# Patient Record
Sex: FEMALE | Race: WHITE | NOT HISPANIC OR LATINO | Employment: UNEMPLOYED | URBAN - METROPOLITAN AREA
[De-identification: names, ages, dates, MRNs, and addresses within clinical notes are randomized per-mention and may not be internally consistent; named-entity substitution may affect disease eponyms.]

---

## 2018-10-22 LAB — STREP GP B DNA PCR: NEGATIVE

## 2018-11-28 ENCOUNTER — APPOINTMENT (OUTPATIENT)
Dept: OBGYN | Facility: MEDICAL CENTER | Age: 38
End: 2018-11-28
Attending: OBSTETRICS & GYNECOLOGY
Payer: COMMERCIAL

## 2018-11-28 ENCOUNTER — HOSPITAL ENCOUNTER (INPATIENT)
Facility: MEDICAL CENTER | Age: 38
LOS: 3 days | End: 2018-12-01
Attending: OBSTETRICS & GYNECOLOGY | Admitting: OBSTETRICS & GYNECOLOGY
Payer: COMMERCIAL

## 2018-11-28 PROCEDURE — 770002 HCHG ROOM/CARE - OB PRIVATE (112)

## 2018-11-28 PROCEDURE — 10H07YZ INSERTION OF OTHER DEVICE INTO PRODUCTS OF CONCEPTION, VIA NATURAL OR ARTIFICIAL OPENING: ICD-10-PCS | Performed by: OBSTETRICS & GYNECOLOGY

## 2018-11-28 PROCEDURE — 3E0P7VZ INTRODUCTION OF HORMONE INTO FEMALE REPRODUCTIVE, VIA NATURAL OR ARTIFICIAL OPENING: ICD-10-PCS | Performed by: OBSTETRICS & GYNECOLOGY

## 2018-11-28 RX ORDER — MISOPROSTOL 200 UG/1
800 TABLET ORAL
Status: DISCONTINUED | OUTPATIENT
Start: 2018-11-28 | End: 2018-11-29 | Stop reason: HOSPADM

## 2018-11-28 RX ORDER — SODIUM CHLORIDE, SODIUM LACTATE, POTASSIUM CHLORIDE, CALCIUM CHLORIDE 600; 310; 30; 20 MG/100ML; MG/100ML; MG/100ML; MG/100ML
INJECTION, SOLUTION INTRAVENOUS CONTINUOUS
Status: DISPENSED | OUTPATIENT
Start: 2018-11-28 | End: 2018-11-29

## 2018-11-28 RX ORDER — DEXTROSE, SODIUM CHLORIDE, SODIUM LACTATE, POTASSIUM CHLORIDE, AND CALCIUM CHLORIDE 5; .6; .31; .03; .02 G/100ML; G/100ML; G/100ML; G/100ML; G/100ML
INJECTION, SOLUTION INTRAVENOUS CONTINUOUS
Status: DISCONTINUED | OUTPATIENT
Start: 2018-11-29 | End: 2018-12-01 | Stop reason: HOSPADM

## 2018-11-28 RX ORDER — CITRIC ACID/SODIUM CITRATE 334-500MG
30 SOLUTION, ORAL ORAL EVERY 6 HOURS PRN
Status: DISCONTINUED | OUTPATIENT
Start: 2018-11-28 | End: 2018-11-29 | Stop reason: HOSPADM

## 2018-11-29 LAB
BASOPHILS # BLD AUTO: 0.2 % (ref 0–1.8)
BASOPHILS # BLD: 0.02 K/UL (ref 0–0.12)
EOSINOPHIL # BLD AUTO: 0.05 K/UL (ref 0–0.51)
EOSINOPHIL NFR BLD: 0.5 % (ref 0–6.9)
ERYTHROCYTE [DISTWIDTH] IN BLOOD BY AUTOMATED COUNT: 43.4 FL (ref 35.9–50)
ERYTHROCYTE [DISTWIDTH] IN BLOOD BY AUTOMATED COUNT: 43.7 FL (ref 35.9–50)
HCT VFR BLD AUTO: 34.3 % (ref 37–47)
HCT VFR BLD AUTO: 36.8 % (ref 37–47)
HGB BLD-MCNC: 11.6 G/DL (ref 12–16)
HGB BLD-MCNC: 13.1 G/DL (ref 12–16)
HOLDING TUBE BB 8507: NORMAL
IMM GRANULOCYTES # BLD AUTO: 0.05 K/UL (ref 0–0.11)
IMM GRANULOCYTES NFR BLD AUTO: 0.5 % (ref 0–0.9)
LYMPHOCYTES # BLD AUTO: 2.7 K/UL (ref 1–4.8)
LYMPHOCYTES NFR BLD: 28.3 % (ref 22–41)
MCH RBC QN AUTO: 30.4 PG (ref 27–33)
MCH RBC QN AUTO: 31.6 PG (ref 27–33)
MCHC RBC AUTO-ENTMCNC: 33.8 G/DL (ref 33.6–35)
MCHC RBC AUTO-ENTMCNC: 35.6 G/DL (ref 33.6–35)
MCV RBC AUTO: 88.7 FL (ref 81.4–97.8)
MCV RBC AUTO: 90 FL (ref 81.4–97.8)
MONOCYTES # BLD AUTO: 0.71 K/UL (ref 0–0.85)
MONOCYTES NFR BLD AUTO: 7.4 % (ref 0–13.4)
NEUTROPHILS # BLD AUTO: 6.02 K/UL (ref 2–7.15)
NEUTROPHILS NFR BLD: 63.1 % (ref 44–72)
NRBC # BLD AUTO: 0 K/UL
NRBC BLD-RTO: 0 /100 WBC
PLATELET # BLD AUTO: 131 K/UL (ref 164–446)
PLATELET # BLD AUTO: 180 K/UL (ref 164–446)
PMV BLD AUTO: 10.3 FL (ref 9–12.9)
PMV BLD AUTO: 10.5 FL (ref 9–12.9)
RBC # BLD AUTO: 3.81 M/UL (ref 4.2–5.4)
RBC # BLD AUTO: 4.15 M/UL (ref 4.2–5.4)
WBC # BLD AUTO: 12.6 K/UL (ref 4.8–10.8)
WBC # BLD AUTO: 9.6 K/UL (ref 4.8–10.8)

## 2018-11-29 PROCEDURE — 700105 HCHG RX REV CODE 258: Performed by: OBSTETRICS & GYNECOLOGY

## 2018-11-29 PROCEDURE — 303615 HCHG EPIDURAL/SPINAL ANESTHESIA FOR LABOR

## 2018-11-29 PROCEDURE — 304965 HCHG RECOVERY SERVICES

## 2018-11-29 PROCEDURE — 0HQ9XZZ REPAIR PERINEUM SKIN, EXTERNAL APPROACH: ICD-10-PCS | Performed by: OBSTETRICS & GYNECOLOGY

## 2018-11-29 PROCEDURE — 59409 OBSTETRICAL CARE: CPT

## 2018-11-29 PROCEDURE — 700101 HCHG RX REV CODE 250

## 2018-11-29 PROCEDURE — 36415 COLL VENOUS BLD VENIPUNCTURE: CPT

## 2018-11-29 PROCEDURE — 85025 COMPLETE CBC W/AUTO DIFF WBC: CPT

## 2018-11-29 PROCEDURE — 700111 HCHG RX REV CODE 636 W/ 250 OVERRIDE (IP): Performed by: OBSTETRICS & GYNECOLOGY

## 2018-11-29 PROCEDURE — 85027 COMPLETE CBC AUTOMATED: CPT

## 2018-11-29 PROCEDURE — A9270 NON-COVERED ITEM OR SERVICE: HCPCS | Performed by: OBSTETRICS & GYNECOLOGY

## 2018-11-29 PROCEDURE — 700111 HCHG RX REV CODE 636 W/ 250 OVERRIDE (IP)

## 2018-11-29 PROCEDURE — 10907ZC DRAINAGE OF AMNIOTIC FLUID, THERAPEUTIC FROM PRODUCTS OF CONCEPTION, VIA NATURAL OR ARTIFICIAL OPENING: ICD-10-PCS | Performed by: OBSTETRICS & GYNECOLOGY

## 2018-11-29 PROCEDURE — 59200 INSERT CERVICAL DILATOR: CPT

## 2018-11-29 PROCEDURE — 700102 HCHG RX REV CODE 250 W/ 637 OVERRIDE(OP): Performed by: OBSTETRICS & GYNECOLOGY

## 2018-11-29 PROCEDURE — 3E033VJ INTRODUCTION OF OTHER HORMONE INTO PERIPHERAL VEIN, PERCUTANEOUS APPROACH: ICD-10-PCS | Performed by: OBSTETRICS & GYNECOLOGY

## 2018-11-29 PROCEDURE — 770002 HCHG ROOM/CARE - OB PRIVATE (112)

## 2018-11-29 RX ORDER — SODIUM CHLORIDE, SODIUM LACTATE, POTASSIUM CHLORIDE, CALCIUM CHLORIDE 600; 310; 30; 20 MG/100ML; MG/100ML; MG/100ML; MG/100ML
INJECTION, SOLUTION INTRAVENOUS CONTINUOUS
Status: DISCONTINUED | OUTPATIENT
Start: 2018-11-29 | End: 2018-11-29

## 2018-11-29 RX ORDER — SODIUM CHLORIDE, SODIUM LACTATE, POTASSIUM CHLORIDE, AND CALCIUM CHLORIDE .6; .31; .03; .02 G/100ML; G/100ML; G/100ML; G/100ML
1000 INJECTION, SOLUTION INTRAVENOUS
Status: DISCONTINUED | OUTPATIENT
Start: 2018-11-29 | End: 2018-11-29 | Stop reason: HOSPADM

## 2018-11-29 RX ORDER — ONDANSETRON 2 MG/ML
4 INJECTION INTRAMUSCULAR; INTRAVENOUS EVERY 6 HOURS PRN
Status: DISCONTINUED | OUTPATIENT
Start: 2018-11-29 | End: 2018-12-01 | Stop reason: HOSPADM

## 2018-11-29 RX ORDER — BISACODYL 10 MG
10 SUPPOSITORY, RECTAL RECTAL PRN
Status: DISCONTINUED | OUTPATIENT
Start: 2018-11-29 | End: 2018-12-01 | Stop reason: HOSPADM

## 2018-11-29 RX ORDER — ONDANSETRON 2 MG/ML
INJECTION INTRAMUSCULAR; INTRAVENOUS
Status: COMPLETED
Start: 2018-11-29 | End: 2018-11-29

## 2018-11-29 RX ORDER — ONDANSETRON 4 MG/1
4 TABLET, ORALLY DISINTEGRATING ORAL EVERY 6 HOURS PRN
Status: DISCONTINUED | OUTPATIENT
Start: 2018-11-29 | End: 2018-12-01 | Stop reason: HOSPADM

## 2018-11-29 RX ORDER — DOCUSATE SODIUM 100 MG/1
100 CAPSULE, LIQUID FILLED ORAL 2 TIMES DAILY PRN
Status: DISCONTINUED | OUTPATIENT
Start: 2018-11-29 | End: 2018-12-01 | Stop reason: HOSPADM

## 2018-11-29 RX ORDER — MISOPROSTOL 200 UG/1
800 TABLET ORAL
Status: DISCONTINUED | OUTPATIENT
Start: 2018-11-29 | End: 2018-12-01 | Stop reason: HOSPADM

## 2018-11-29 RX ORDER — ROPIVACAINE HYDROCHLORIDE 2 MG/ML
INJECTION, SOLUTION EPIDURAL; INFILTRATION; PERINEURAL CONTINUOUS
Status: DISCONTINUED | OUTPATIENT
Start: 2018-11-29 | End: 2018-11-29

## 2018-11-29 RX ORDER — ROPIVACAINE HYDROCHLORIDE 2 MG/ML
INJECTION, SOLUTION EPIDURAL; INFILTRATION; PERINEURAL
Status: COMPLETED
Start: 2018-11-29 | End: 2018-11-29

## 2018-11-29 RX ORDER — SODIUM CHLORIDE, SODIUM LACTATE, POTASSIUM CHLORIDE, AND CALCIUM CHLORIDE .6; .31; .03; .02 G/100ML; G/100ML; G/100ML; G/100ML
250 INJECTION, SOLUTION INTRAVENOUS PRN
Status: DISCONTINUED | OUTPATIENT
Start: 2018-11-29 | End: 2018-11-29 | Stop reason: HOSPADM

## 2018-11-29 RX ORDER — ACETAMINOPHEN 325 MG/1
325 TABLET ORAL EVERY 4 HOURS PRN
Status: DISCONTINUED | OUTPATIENT
Start: 2018-11-29 | End: 2018-12-01 | Stop reason: HOSPADM

## 2018-11-29 RX ORDER — BUPIVACAINE HYDROCHLORIDE 2.5 MG/ML
INJECTION, SOLUTION EPIDURAL; INFILTRATION; INTRACAUDAL
Status: DISCONTINUED
Start: 2018-11-29 | End: 2018-11-29

## 2018-11-29 RX ORDER — HYDROCODONE BITARTRATE AND ACETAMINOPHEN 10; 325 MG/1; MG/1
1 TABLET ORAL EVERY 4 HOURS PRN
Status: DISCONTINUED | OUTPATIENT
Start: 2018-11-29 | End: 2018-12-01 | Stop reason: HOSPADM

## 2018-11-29 RX ORDER — SODIUM CHLORIDE, SODIUM LACTATE, POTASSIUM CHLORIDE, CALCIUM CHLORIDE 600; 310; 30; 20 MG/100ML; MG/100ML; MG/100ML; MG/100ML
INJECTION, SOLUTION INTRAVENOUS PRN
Status: DISCONTINUED | OUTPATIENT
Start: 2018-11-29 | End: 2018-12-01 | Stop reason: HOSPADM

## 2018-11-29 RX ORDER — IBUPROFEN 600 MG/1
600 TABLET ORAL EVERY 6 HOURS PRN
Status: DISCONTINUED | OUTPATIENT
Start: 2018-11-29 | End: 2018-12-01 | Stop reason: HOSPADM

## 2018-11-29 RX ORDER — VITAMIN A ACETATE, BETA CAROTENE, ASCORBIC ACID, CHOLECALCIFEROL, .ALPHA.-TOCOPHEROL ACETATE, DL-, THIAMINE MONONITRATE, RIBOFLAVIN, NIACINAMIDE, PYRIDOXINE HYDROCHLORIDE, FOLIC ACID, CYANOCOBALAMIN, CALCIUM CARBONATE, FERROUS FUMARATE, ZINC OXIDE, CUPRIC OXIDE 3080; 12; 120; 400; 1; 1.84; 3; 20; 22; 920; 25; 200; 27; 10; 2 [IU]/1; UG/1; MG/1; [IU]/1; MG/1; MG/1; MG/1; MG/1; MG/1; [IU]/1; MG/1; MG/1; MG/1; MG/1; MG/1
1 TABLET, FILM COATED ORAL EVERY MORNING
Status: DISCONTINUED | OUTPATIENT
Start: 2018-11-30 | End: 2018-12-01 | Stop reason: HOSPADM

## 2018-11-29 RX ORDER — HYDROCODONE BITARTRATE AND ACETAMINOPHEN 5; 325 MG/1; MG/1
1 TABLET ORAL EVERY 4 HOURS PRN
Status: DISCONTINUED | OUTPATIENT
Start: 2018-11-29 | End: 2018-12-01 | Stop reason: HOSPADM

## 2018-11-29 RX ADMIN — MISOPROSTOL 25 MCG: 100 TABLET ORAL at 01:15

## 2018-11-29 RX ADMIN — ONDANSETRON 4 MG: 2 INJECTION INTRAMUSCULAR; INTRAVENOUS at 09:42

## 2018-11-29 RX ADMIN — SODIUM CHLORIDE, POTASSIUM CHLORIDE, SODIUM LACTATE AND CALCIUM CHLORIDE: 600; 310; 30; 20 INJECTION, SOLUTION INTRAVENOUS at 00:00

## 2018-11-29 RX ADMIN — ROPIVACAINE HYDROCHLORIDE: 2 INJECTION, SOLUTION EPIDURAL; INFILTRATION at 10:25

## 2018-11-29 RX ADMIN — IBUPROFEN 600 MG: 600 TABLET, FILM COATED ORAL at 18:29

## 2018-11-29 RX ADMIN — Medication 1 MILLI-UNITS/MIN: at 06:40

## 2018-11-29 RX ADMIN — ROPIVACAINE HYDROCHLORIDE: 2 INJECTION, SOLUTION EPIDURAL; INFILTRATION; PERINEURAL at 10:25

## 2018-11-29 RX ADMIN — SODIUM CHLORIDE, POTASSIUM CHLORIDE, SODIUM LACTATE AND CALCIUM CHLORIDE: 600; 310; 30; 20 INJECTION, SOLUTION INTRAVENOUS at 06:35

## 2018-11-29 RX ADMIN — IBUPROFEN 600 MG: 600 TABLET, FILM COATED ORAL at 12:37

## 2018-11-29 RX ADMIN — ONDANSETRON 4 MG: 4 TABLET, ORALLY DISINTEGRATING ORAL at 16:26

## 2018-11-29 RX ADMIN — SODIUM CHLORIDE, SODIUM LACTATE, POTASSIUM CHLORIDE, CALCIUM CHLORIDE AND DEXTROSE MONOHYDRATE: 5; 600; 310; 30; 20 INJECTION, SOLUTION INTRAVENOUS at 09:02

## 2018-11-29 RX ADMIN — Medication 125 ML/HR: at 12:37

## 2018-11-29 ASSESSMENT — COPD QUESTIONNAIRES
DURING THE PAST 4 WEEKS HOW MUCH DID YOU FEEL SHORT OF BREATH: NONE/LITTLE OF THE TIME
COPD SCREENING SCORE: 0
HAVE YOU SMOKED AT LEAST 100 CIGARETTES IN YOUR ENTIRE LIFE: NO/DON'T KNOW
IN THE PAST 12 MONTHS DO YOU DO LESS THAN YOU USED TO BECAUSE OF YOUR BREATHING PROBLEMS: DISAGREE/UNSURE
DO YOU EVER COUGH UP ANY MUCUS OR PHLEGM?: NO/ONLY WITH OCCASIONAL COLDS OR INFECTIONS

## 2018-11-29 ASSESSMENT — PAIN SCALES - GENERAL
PAINLEVEL_OUTOF10: 4
PAINLEVEL_OUTOF10: 3
PAINLEVEL_OUTOF10: 0
PAINLEVEL_OUTOF10: 2

## 2018-11-29 ASSESSMENT — LIFESTYLE VARIABLES
EVER_SMOKED: NEVER
ALCOHOL_USE: NO

## 2018-11-29 ASSESSMENT — PATIENT HEALTH QUESTIONNAIRE - PHQ9
SUM OF ALL RESPONSES TO PHQ9 QUESTIONS 1 AND 2: 0
1. LITTLE INTEREST OR PLEASURE IN DOING THINGS: NOT AT ALL
2. FEELING DOWN, DEPRESSED, IRRITABLE, OR HOPELESS: NOT AT ALL

## 2018-11-29 NOTE — H&P
"Department of Obstetrics and Gynecology  Labor and Delivery History and Physical    Date of Admission: 2018      ID: 38 y.o.  with IUP at 40w4d     Primary OB: Armando Hendricks M.D.    Attending OB: Armando Hendricks M.D.    CC: Elective IOL, post dates    HPI: Criss Walker is a 38 y.o.  at 40w4d by LMP c/w 14 week ultrasound, who presents with for elective IOL given GA post dates.  She is feeling well.  The patient received 1 dose of misoprostol for cervical ripening overnight.  She was yuni too much for another dose and was started on oxytocin.   She is otherwise feeling well.  Her contractions are not uncomfortable yet.  She denies leaking of fluid, vaginal bleeding.  She has had good fetal movement.  Current pregnancy has been uncomplicated, except for AMA, however, her history of 2 prior pregnancies complicated by IUGR required increased surveillance.    ROS: 10 systems reviewed and negative except as noted above.    Obstetric History    -, 39 weeks, female, 5 pounds 16 ounces, IUGR with \"calcified placenta\" early in gestation, induction of labor for the same   G2 -, 39 weeks, male, 5 pounds 11 ounces, IUGR with \"calcified placenta\" early in gestation, induction of labor for the same.    G3 - Current, as noted in HPI      Past Medical History  Surgical History   AMA  CF carrier  LASEK eye surgery      Gynecologic History  Social History   Regular menses prior to pregnancy  Denies Hx of abnormal pap smears, since the age of 24.  Denies Hx of STIs Tobacco: Denies  EtOH: Denies  Street Drugs: Denies      Medications  Allergies   PNV No Known Allergies     Family History   Elevated cholesterol (father), hypertension (father), melanoma (sister)       O: /78   Pulse 73   Temp 36.7 °C (98.1 °F) (Temporal)   Resp 18   Ht 1.448 m (4' 9\")   Wt 57.6 kg (127 lb)   BMI 27.48 kg/m²       Gen: NAD, AAO  Resp: unlabored  Abd: Gravid, NTTP,Cephalic by Leopolds, No rebound or " guarding  Ext: NTTP, no edema, 2+DPP  Pelvic: SVE 2-3/60/-3, AROM for clear fluid    FHT:  135/mod mirian/+accels, -decels  Ezel: CTX q3-4min    Labs:   Lab Results   Component Value Date/Time    WBC 9.6 2018 12:00 AM    RBC 4.15 (L) 2018 12:00 AM    HEMOGLOBIN 13.1 2018 12:00 AM    HEMATOCRIT 36.8 (L) 2018 12:00 AM    MCV 88.7 2018 12:00 AM    RDW 43.4 2018 12:00 AM    PLATELETCT 180 2018 12:00 AM       Prenatal labs:   Lab  Result    Rh  positive    Antibody screen  negative    Rubella  immune    HIV  Non-reactive    RPR  Non-reactive    HBsAg  negative    Varicella  immune    Urine Culture  wnl    Gonorrhea/chlamydia/trich  negative/negative/negative    Aneuploidy screening  cffDNA negative, AFP negative    1h Glucose  127 wnl    GBS  negative       A/P: Criss Walker is a  at 40w4d by LMP c/w 14wk U/S who presents for elective induction of labor given gestational age post dates..  AVSS.  Cat I FHT.  *Admit to L&D  *IV, CBC, T&S on hold  *Labor: Patient is received 1 dose of misoprostol and is yuni too much for more.  She was started on oxytocin and just underwent artificial rupture of membranes with placement of IUPC for contraction monitoring.  Recheck patient's cervix when uncomfortable.  *History of IUGR: Baby has been appropriately grown throughout this pregnancy as followed with Dr. Mandel.  *FWB: Reassuring, reactive, Cat I FHT.  CEFM.  *Pain: Planning epidural when appropriate  *Global: Rh+, RubImm, VarImm, GBS neg.    - Clears    Armando Hendricks MD, MS,  2018, 7:56 AM

## 2018-11-29 NOTE — PROGRESS NOTES
0700- Report received from JESIKA Key RN.  0741- Dr. Hendricks at the bedside, SVE 2-3/60/-3, AROM, clr, IUPC placed.  0935- LR bolus started for epidural bolus.  0940- pitocin discontinued.  0950- SVE 4-5/90/-1, ONESIMO Rivera RN. Dr. Laguerre requested for epidural placement.  1000- pt feeling pressure, SVE 5-6/90/-1, ONESIMO Rivera, SILVER.  Dr. Hendricks notified of pt's labor status.  1015- Dr. Laguerre at the bedside, epidural placed.  1030- SVE 7-8/90/0, ONESIMO Rivera, SILVER, Dr. Hendricks updated on pt's status.  1040- straight cath, 125mL urine output.  1053- Dr. Hendricks SVE ant lip reduced, C/+1  1126- Dr. Hendricks, , viable female, apgars 8, 9.  1129- Placenta del  1440- Report given to SOREN Velasquez RN. Pt to be transferred to PP unit.

## 2018-11-29 NOTE — CARE PLAN
Problem: Knowledge Deficit  Goal: Patient/Support person demonstrates understanding regarding the progression of labor, available options and participates in decision-making process  Outcome: PROGRESSING AS EXPECTED  POC reviewed and understanding verbalized.    Problem: Pain  Goal: Alleviation of Pain or a reduction in pain to the patient's comfort goal  Outcome: PROGRESSING AS EXPECTED  Pt denies pain and aware of pain management options.

## 2018-11-29 NOTE — PROGRESS NOTES
RUBY   40.3 weeks GA    2330_ Pt presents to L&D for scheduled IOL. Pt denies painful UC's, LOF and VB. Pt states +FM. EFM and TOCO applied. VSS.  0000_ SVE  /-3  0115_ Cytotec placed by SOREN Newman RN  0530_ SVE  2/50/-3  0640_ Pitocin started  0700_ Report to ONESIMO Rivera RN.

## 2018-11-30 PROCEDURE — 700102 HCHG RX REV CODE 250 W/ 637 OVERRIDE(OP): Performed by: OBSTETRICS & GYNECOLOGY

## 2018-11-30 PROCEDURE — A9270 NON-COVERED ITEM OR SERVICE: HCPCS | Performed by: OBSTETRICS & GYNECOLOGY

## 2018-11-30 PROCEDURE — 770002 HCHG ROOM/CARE - OB PRIVATE (112)

## 2018-11-30 RX ORDER — IBUPROFEN 600 MG/1
600 TABLET ORAL EVERY 6 HOURS PRN
Qty: 30 TAB | Refills: 1 | Status: SHIPPED | OUTPATIENT
Start: 2018-11-30

## 2018-11-30 RX ORDER — VALACYCLOVIR HYDROCHLORIDE 500 MG/1
1000 TABLET, FILM COATED ORAL DAILY
Status: DISCONTINUED | OUTPATIENT
Start: 2018-11-30 | End: 2018-12-01 | Stop reason: HOSPADM

## 2018-11-30 RX ORDER — ACYCLOVIR 400 MG/1
400 TABLET ORAL 2 TIMES DAILY
Qty: 30 TAB | Refills: 1 | Status: SHIPPED | OUTPATIENT
Start: 2018-11-30

## 2018-11-30 RX ORDER — ACYCLOVIR 400 MG/1
400 TABLET ORAL 2 TIMES DAILY
Qty: 30 TAB | Refills: 1 | Status: SHIPPED | OUTPATIENT
Start: 2018-11-30 | End: 2018-11-30

## 2018-11-30 RX ORDER — PSEUDOEPHEDRINE HCL 30 MG
100 TABLET ORAL 2 TIMES DAILY PRN
Qty: 60 CAP | Refills: 1 | Status: SHIPPED | OUTPATIENT
Start: 2018-11-30

## 2018-11-30 RX ORDER — VALACYCLOVIR HYDROCHLORIDE 500 MG/1
500 TABLET, FILM COATED ORAL 2 TIMES DAILY
Status: DISCONTINUED | OUTPATIENT
Start: 2018-11-30 | End: 2018-11-30

## 2018-11-30 RX ADMIN — IBUPROFEN 600 MG: 600 TABLET, FILM COATED ORAL at 05:43

## 2018-11-30 RX ADMIN — IBUPROFEN 600 MG: 600 TABLET, FILM COATED ORAL at 11:27

## 2018-11-30 RX ADMIN — Medication 1 TABLET: at 08:29

## 2018-11-30 RX ADMIN — IBUPROFEN 600 MG: 600 TABLET, FILM COATED ORAL at 23:30

## 2018-11-30 RX ADMIN — VALACYCLOVIR HYDROCHLORIDE 1000 MG: 500 TABLET, FILM COATED ORAL at 09:30

## 2018-11-30 RX ADMIN — IBUPROFEN 600 MG: 600 TABLET, FILM COATED ORAL at 00:02

## 2018-11-30 RX ADMIN — IBUPROFEN 600 MG: 600 TABLET, FILM COATED ORAL at 17:34

## 2018-11-30 ASSESSMENT — EDINBURGH POSTNATAL DEPRESSION SCALE (EPDS)
I HAVE FELT SAD OR MISERABLE: NO, NOT AT ALL
THE THOUGHT OF HARMING MYSELF HAS OCCURRED TO ME: NEVER
I HAVE LOOKED FORWARD WITH ENJOYMENT TO THINGS: AS MUCH AS I EVER DID
I HAVE BEEN ANXIOUS OR WORRIED FOR NO GOOD REASON: NO, NOT AT ALL
I HAVE FELT SCARED OR PANICKY FOR NO GOOD REASON: NO, NOT MUCH
I HAVE BEEN SO UNHAPPY THAT I HAVE HAD DIFFICULTY SLEEPING: NOT AT ALL
I HAVE BEEN SO UNHAPPY THAT I HAVE BEEN CRYING: NO, NEVER
I HAVE BEEN ABLE TO LAUGH AND SEE THE FUNNY SIDE OF THINGS: AS MUCH AS I ALWAYS COULD
THINGS HAVE BEEN GETTING ON TOP OF ME: NO, MOST OF THE TIME I HAVE COPED QUITE WELL
I HAVE BLAMED MYSELF UNNECESSARILY WHEN THINGS WENT WRONG: NOT VERY OFTEN

## 2018-11-30 ASSESSMENT — PAIN SCALES - GENERAL
PAINLEVEL_OUTOF10: 4
PAINLEVEL_OUTOF10: 2
PAINLEVEL_OUTOF10: 2
PAINLEVEL_OUTOF10: 4
PAINLEVEL_OUTOF10: 2
PAINLEVEL_OUTOF10: 2
PAINLEVEL_OUTOF10: 3
PAINLEVEL_OUTOF10: 3
PAINLEVEL_OUTOF10: 2

## 2018-11-30 NOTE — LACTATION NOTE
"This note was copied from a baby's chart.  Met with mother who states baby BF well and frequently throughout the night, baby is currently sleepy, assisted with BF attempt but no feeding cues noted and no latch obtained, large drops of colostrum expressed easily via HE, discussed expected feeding frequency and duration, discussed normal  behaviors including sleep-wake cycle, mother reports that baby sucks well at times but at others \"chomps\" on the breast, verbal education provided along with a physical demonstration of suck training t encourage more consistent effective sucking, encouraged to call for latch assistance as needed.    Discussed outpatient assistance available at [facility] after discharge, invited to BF [place] and encouraged to call to schedule 1:1 consult as needed    Encouraged to call for assistance as needed  "

## 2018-11-30 NOTE — PROGRESS NOTES
Obstetrics and Gynecology  Postpartum Progress Note    CC: PPD1 s/p     S: Pt feeling well.  Pain well controlled.  Elza reg diet.  Has ambulated.  Lochia mild. Voiding w/o difficulty.  -flatus/-BM.  Breastfeeding.  Pt denies CP/SOB, N/V, constipation/diarrhea, lower leg pain.      O:   Vitals:    18 1423 18 1600 18 2000 18 0000   BP: 107/61 108/71 112/59 106/72   Pulse: 73 63 (!) 59 62   Resp:  16 18 16   Temp:  36.6 °C (97.8 °F) 36.6 °C (97.8 °F) 36.5 °C (97.7 °F)   TempSrc:  Temporal Temporal Temporal   SpO2:  96% 97% 94%   Weight:       Height:          Temp (24hrs), Av.6 °C (97.8 °F), Min:36.5 °C (97.7 °F), Max:36.6 °C (97.8 °F)       Gen: NAD, AAO    CV: RRR    Pulm: unlabored    Abd: Soft, NT, no rebound/guarding, fundus firm at U.    Ext: WWP, no edema, non-tender to palpation    CBC   2018 00:00 2018 20:35   WBC 9.6 12.6 (H)   RBC 4.15 (L) 3.81 (L)   Hemoglobin 13.1 11.6 (L)   Hematocrit 36.8 (L) 34.3 (L)   MCV 88.7 90.0   MCH 31.6 30.4   MCHC 35.6 (H) 33.8   RDW 43.4 43.7   Platelet Count 180 131 (L)       A/P: Criss Walker is a 38 y.o.  postpartum s/p .  AVSS.  Recovering well.    - Continue routine postpartum care.    - Encourage ambulation.    - Continue current pain regimen    - Rh +.  Rhogam NOT indicated.    - Rubella Imm.  Varicella Imm.    Anticipate d/c tomorrow    Armando Hendricks MD, MS,  2018, 8:54 AM

## 2018-11-30 NOTE — L&D DELIVERY NOTE
DATE OF SERVICE:  2018    PRIMARY AND DELIVERING OBSTETRICIAN:  Armando Hendricks MD    PROCEDURES:  Normal spontaneous vaginal delivery.    PREPROCEDURE DIAGNOSES:  1.  A 38-year-old  3, para 2-0-0-2 with intrauterine pregnancy at 40   weeks 4 days gestational age.  2.  Elective induction of labor.  3.  History of intrauterine growth retardation in 2 prior pregnancies, this   pregnancy unaffected.  4.  Advanced maternal age.    POSTPROCEDURE DIAGNOSES:  1.  A 38-year-old  3, para 2-0-0-2 with intrauterine pregnancy at 40   weeks 4 days gestational age.  2.  Elective induction of labor.  3.  History of intrauterine growth retardation in 2 prior pregnancies, this   pregnancy unaffected.  4.  Advanced maternal age.  5.  Postpartum, status post normal spontaneous vaginal delivery.    ANESTHESIA:  Epidural.    ANESTHESIOLOGIST:  Shlomo Laguerre MD    FINDINGS:  1.  Viable female infant in OLIVE position, delivered at 11:26 on 2018.  2.  Weight unavailable at the time of this dictation.  3.  Apgars 8 at 1 minute and 9 at 5 minutes.  4.  Intact, normal-appearing placenta with 3-vessel cord and central cord   insertion.  5.  Clear amniotic fluid.  6.  A 1-cm posterior vaginal abrasion.  7.  Recurrent variable decelerations and a couple of prolonged decelerations   during the second stage of labor, improved by pushing with every other   contraction.    ESTIMATED BLOOD LOSS:  100 mL    NARRATIVE:  This 38-year-old G3, P2-0-0-2 with intrauterine pregnancy at 40   weeks 4 days gestational age, presented to labor and delivery for elective   induction of labor.  She received 1 dose of misoprostol overnight and was   started on oxytocin this morning.  She underwent artificial rupture of   membranes and then progressed along an accelerated labor curve to complete   dilation.  She then pushed for 33 minutes until delivery of the fetal head,   which occurred atraumatically.  It was guided out gently  without traction.    The anterior and posterior shoulders were delivered quickly thereafter and   delivery in whole was atraumatic.  During the second stage of labor as noted   above, there were some variable decelerations as well as a prolonged   deceleration.  This improved with pushing with every other contraction.  Once   the baby was delivered, it was noted to be immediately vigorous and moving all   extremities equally.  As such, we awaited cessation of cord pulsation before   the cord was doubly clamped and cut.  The infant was noted to be moving all   extremities equally.  Once the cord was cut, the placenta was then delivered   quickly thereafter and was noted to be intact as noted above.  A survey of the   patient's perineum, vulva and vagina revealed the above findings.  The 1-cm   transverse posterior vaginal abrasion was reapproximated with 2 interrupted   stitches of 3-0 Vicryl.  At the end of the repair, the edges were hemostatic   and well reapproximated.  The patient tolerated the procedure well.  There   were no complications.  Sponge and needle counts were correct at the end of   the procedure.  The patient and her infant remained in her birthing room   before later transfer to maternity.  Dr. Hendricks was present throughout and   performed the entire procedure.    COMPLICATIONS:  None.    CONDITION:  Good.    DISPOSITION:  Birthing room, then maternity.       ____________________________________     MD NADJA Wei / MARY    DD:  11/29/2018 11:52:35  DT:  11/29/2018 18:21:24    D#:  [id number]  Job#:  [id number]

## 2018-11-30 NOTE — PROGRESS NOTES
Assumed care of patient at 0700.  Patient VSS, on s/s of infection or distress.  Fundus firm one fingerbreadth below umbilicus, light lochia.  Patient reports mild pain, reports relief with PRN pain medication and repositioning.  Calm and cooperative, communicated needs.  Will continue to monitor throughout shift.

## 2018-11-30 NOTE — CARE PLAN
Problem: Pain Management  Goal: Pain level will decrease to patient's comfort goal  Outcome: PROGRESSING AS EXPECTED  Patient reports minimal pain at this time, denies need for pain medication, will continue to assess.     Problem: Altered physiologic condition related to immediate post-delivery state and potential for bleeding/hemorrhage  Goal: Patient physiologically stable as evidenced by normal lochia, palpable uterine involution and vital signs within normal limits  Outcome: PROGRESSING AS EXPECTED  Fundus firm at umbilicus, light lochia.  VSS.

## 2018-11-30 NOTE — PROGRESS NOTES
Patient arrived to postpartum 1505. Report received from SILVER Yoon. VSS, no s/s of distress.  Fundus firm at umbilicus, lochia light.  Patient reporting numbness and tingling in right leg from epidural.  Educated patient to call for assistance prior to getting out of bed.  Reports minimal pain, denies need for pain medication at this time, will continue to monitor.  Reports feeling nauseous, PRN zofran given, will continue to monitor.  Calm and cooperative, communicated needs. Oriented to room, call light, plan of care.   at bedside.

## 2018-11-30 NOTE — CARE PLAN
Problem: Infection  Goal: Will remain free from infection  Outcome: PROGRESSING AS EXPECTED  VSS, no s/s of infection.  Will continue to monitor.     Problem: Fluid Volume:  Goal: Will maintain balanced intake and output  Outcome: PROGRESSING AS EXPECTED  Adequate PO intake, voiding adequate amount of urine.  Will continue to monitor.

## 2018-12-01 VITALS
OXYGEN SATURATION: 97 % | HEART RATE: 93 BPM | HEIGHT: 57 IN | WEIGHT: 127 LBS | BODY MASS INDEX: 27.4 KG/M2 | SYSTOLIC BLOOD PRESSURE: 108 MMHG | TEMPERATURE: 98.2 F | DIASTOLIC BLOOD PRESSURE: 58 MMHG | RESPIRATION RATE: 17 BRPM

## 2018-12-01 PROCEDURE — 700102 HCHG RX REV CODE 250 W/ 637 OVERRIDE(OP): Performed by: OBSTETRICS & GYNECOLOGY

## 2018-12-01 PROCEDURE — A9270 NON-COVERED ITEM OR SERVICE: HCPCS | Performed by: OBSTETRICS & GYNECOLOGY

## 2018-12-01 RX ADMIN — IBUPROFEN 600 MG: 600 TABLET, FILM COATED ORAL at 13:34

## 2018-12-01 RX ADMIN — Medication 1 TABLET: at 07:30

## 2018-12-01 RX ADMIN — VALACYCLOVIR HYDROCHLORIDE 1000 MG: 500 TABLET, FILM COATED ORAL at 08:00

## 2018-12-01 RX ADMIN — IBUPROFEN 600 MG: 600 TABLET, FILM COATED ORAL at 07:30

## 2018-12-01 ASSESSMENT — PAIN SCALES - GENERAL
PAINLEVEL_OUTOF10: 4
PAINLEVEL_OUTOF10: 4

## 2018-12-01 NOTE — PROGRESS NOTES
Progress Note    Criss Walker   PP day 2  Chief Admitting Dx: Pregnancy  Labor and delivery, indication for care  Delivery Type: vaginal, spontaneous      Subjective:  Ambulating:voiding,terell diet, normal lochia, pain controlled.   Vitals:    11/29/18 2000 11/30/18 0000 11/30/18 0800 11/30/18 2000   BP: 112/59 106/72 111/74 105/68   Pulse: (!) 59 62 87 65   Resp: 18 16 17 16   Temp: 36.6 °C (97.8 °F) 36.5 °C (97.7 °F) 36.7 °C (98.1 °F) 37 °C (98.6 °F)   TempSrc: Temporal Temporal Temporal Temporal   SpO2: 97% 94% 97% 94%   Weight:       Height:           Exam:  Gen: no distress  Abdomen:gravid nt/nd  Ext: no calf pain sheldon LE    Labs:   No results found for this or any previous visit (from the past 24 hour(s)).    Assessment:  Ppd 2    Plan:  Dc home  Encourage ambulation  Pelvic rest, no heavy lifting/pulling /pushing  F/u in my office 6 weeks postpartum  Continue prenatal vitamins daily  Give Rhogam if Rh negative and indicated  Give MMR if indcated prior to discharge  Encourage breastfeeding  Continue valtrex for hsv oral outbreak    Dahlia Latham M.D.

## 2018-12-01 NOTE — CARE PLAN
Problem: Safety  Goal: Will remain free from injury  Outcome: PROGRESSING AS EXPECTED  Treaded socks in place, bed in the lowest position, call light and belongings within reach, pt call for assistance appropriately    Problem: Pain Management  Goal: Pain level will decrease to patient's comfort goal  Outcome: PROGRESSING AS EXPECTED  Medicated with ibuprofen per MAR with adequate pain control, hourly rounding in progress    Problem: Altered physiologic condition related to immediate post-delivery state and potential for bleeding/hemorrhage  Goal: Patient physiologically stable as evidenced by normal lochia, palpable uterine involution and vital signs within normal limits  Outcome: PROGRESSING AS EXPECTED  VSS, fundus firm, lochia light

## 2018-12-01 NOTE — DISCHARGE INSTRUCTIONS
POSTPARTUM DISCHARGE INSTRUCTIONS FOR MOM    YOB: 1980   Age: 38 y.o.               Admit Date: 11/28/2018     Discharge Date: 12/1/2018  Attending Doctor:  Armando Hendricks M.D.                  Allergies:  Patient has no known allergies.    Discharged to home by car. Discharged via wheelchair, hospital escort: Yes.  Special equipment needed: Not Applicable  Belongings with: Personal  Be sure to schedule a follow-up appointment with your primary care doctor or any specialists as instructed.     Discharge Plan:     Discharge home   Encourage ambulation   Pelvic rest, no heavy lifting/pulling /pushing   F/u in my office  6 weeks postpartum   Continue prenatal vitamins daily     Influenza Vaccine Indication: Not indicated: Previously immunized this influenza season and > 8 years of age    REASONS TO CALL YOUR OBSTETRICIAN:  1.   Persistent fever or shaking chills (Temperature higher than 100.4)  2.   Heavy bleeding (soaking more than 1 pad per hour); Passing clots  3.   Foul odor from vagina  4.   Mastitis (Breast infection; breast pain, chills, fever, redness)  5.   Urinary pain, burning or frequency  6.   Episiotomy infection  7.   Abdominal incision infection  8.   Severe depression longer than 24 hours    HAND WASHING  · Prior to handling the baby.  · Before breastfeeding or bottle feeding baby.  · After using the bathroom or changing the baby's diaper.      VAGINAL CARE  · Nothing inside vagina for 6 weeks: no sexual intercourse, tampons or douching.  · Bleeding may continue for 2-4 weeks.  Amount may vary.    · Call your physician for heavy bleeding which means soaking more than 1 pad per hour    BIRTH CONTROL  · It is possible to become pregnant at any time after delivery and while breastfeeding.  · Plan to discuss a method of birth control with your physician at your follow up visit. visit.    DIET AND ELIMINATION  · Eating more fiber (bran cereal, fruits, and vegetables) and drinking plenty of  "fluids will help to avoid constipation.  · Urinary frequency after childbirth is normal.    POSTPARTUM BLUES  During the first few days after birth, you may experience a sense of the \"blues\" which may include impatience, irritability or even crying.  These feeling come and go quickly.  However, as many as 1 in 10 women experience emotional symptoms known as postpartum depression.    Postpartum depression:  May start as early as the second or third day after delivery or take several weeks or months to develop.  Symptoms of \"blues\" are present, but are more intense:  Crying spells; loss of appetite; feelings of hopelessness or loss of control; fear of touching the baby; over concern or no concern at all about the baby; little or no concern about your own appearance/caring for yourself; and/or inability to sleep or excessive sleeping.  Contact your physician if you are experiencing any of these symptoms.    Crisis Hotline:  · [place] Crisis Hotline:  3-176-HJVDCAN  Or 1-700.734.7528  · [place] Crisis Hotline:  1-353.447.5842  Or 712-755-5097    PREVENTING SHAKEN BABY:  If you are angry or stressed, PUT THE BABY IN THE CRIB, step away, take some deep breaths, and wait until you are calm to care for the baby.  DO NOT SHAKE THE BABY.  You are not alone, call a supporter for help.    · Crisis Call Center 24/7 crisis line 570-318-7091 or 1-536.754.5937  · You can also text them, text \"ANSWER\" to 774120    QUIT SMOKING/TOBACCO USE:  I understand the use of any tobacco products increases my chance of suffering from future heart disease and could cause other illnesses which may shorten my life. Quitting the use of tobacco products is the single most important thing I can do to improve my health. For further information on smoking / tobacco cessation call a Toll Free Quit Line at 1-523.251.7606 (*[employer] [place]) or 1-458.459.6327 (American Lung Association) or you can access the web based program at " www.lungusa.org.    · [place] Tobacco Users Help Line:  (184) 453-2306       Toll Free: 1-746.586.6294  · Quit Tobacco Program [facility] (112)061-8508    DEPRESSION / SUICIDE RISK:  As you are discharged from this [facility], it is important to learn how to keep safe from harming yourself.    Recognize the warning signs:  · Abrupt changes in personality, positive or negative- including increase in energy   · Giving away possessions  · Change in eating patterns- significant weight changes-  positive or negative  · Change in sleeping patterns- unable to sleep or sleeping all the time   · Unwillingness or inability to communicate  · Depression  · Unusual sadness, discouragement and loneliness  · Talk of wanting to die  · Neglect of personal appearance   · Rebelliousness- reckless behavior  · Withdrawal from people/activities they love  · Confusion- inability to concentrate     If you or a loved one observes any of these behaviors or has concerns about self-harm, here's what you can do:  · Talk about it- your feelings and reasons for harming yourself  · Remove any means that you might use to hurt yourself (examples: pills, rope, extension cords, firearm)  · Get professional help from the community (Mental Health, Substance Abuse, psychological counseling)  · Do not be alone:Call your Safe Contact- someone whom you trust who will be there for you.  · Call your local CRISIS HOTLINE 770-8499 or 116-338-7905  · Call your local Children's Mobile Crisis Response Team [place] (617) 307-8527 or www.[employer]  · Call the toll free National Suicide Prevention Hotlines   · National Suicide Prevention Lifeline 543-215-AKEE (0246)  · National Hope Line Network 800-SUICIDE (042-5488)    DISCHARGE SURVEY:  Thank you for choosing [facility].  We hope we provided you with very good care.  You may be receiving a survey in the mail.  Please fill it out.  Your opinion is valuable to  us.    ADDITIONAL EDUCATIONAL MATERIALS GIVEN TO PATIENT:        My signature on this form indicates that:  1.  I have reviewed and understand the above information  2.  My questions regarding this information have been answered to my satisfaction.  3.  I have formulated a plan with my discharge nurse to obtain my prescribed medication for home.

## 2018-12-01 NOTE — DISCHARGE SUMMARY
DATE OF ADMISSION:  2018    DATE OF DISCHARGE:  2018    PRINCIPAL DIAGNOSES:  1.  A 38-year-old  3, para 2 at 40 weeks gestation.  2.  Elective induction of labor.  3.  History of intrauterine growth restriction, two prior pregnancies, this   pregnancy unaffected.  4.  Advanced maternal age.    PRINCIPAL PROCEDURES:  1.  Induction of labor.  2.  Normal spontaneous vaginal delivery.    HOSPITAL COURSE:  Patient arrived for induction of labor.  She had a vaginal   delivery of a female infant by Dr. Hendricks, Apgars were 8 and 9.  She had a 1 cm   posterior vaginal abrasion that was repaired.  Estimated blood loss was 100   mL.  Her weight was 3150 g.  By postpartum day #2, she is ambulating, voiding,   tolerating regular diet.  Pain is controlled with oral medication.  She will   be discharged home and advised to take over-the-counter Motrin or Tylenol as   needed for pain control.  She does have an oral outbreak of HSV and is on   Valtrex and she will resume that.  She will continue prenatal vitamins.  She   will follow up with Dr. Hendricks in 6 weeks, pelvic rest, no heavy lifting,   pulling, pushing.  Verbal instructions were given and all questions answered.    She was GBS negative.  Her hematocrit was 34.3 and rubella was immune.       ____________________________________     MD ALKA SUAZO / MARY    DD:  2018 08:19:55  DT:  2018 08:36:27    D#:  [id number]  Job#:  [id number]

## 2018-12-01 NOTE — LACTATION NOTE
This note was copied from a baby's chart.  Mother reports that she is breastfeeding her  without difficulty or discomfort. Video on engorgement reviewed.Resources for out-patient support discussed.

## 2018-12-01 NOTE — PROGRESS NOTES
Report received. Assumed care. Pt in bed awake. A/O x4. VSS. Responds appropriately. C/O pain, medicated per MAR, no SOB. Assessment complete. Fundus firm, lochia light. Discussed POC, pain control,  care, breastfeeding times, skin to skin, safety, DC planning , pt verbalizes understanding. Call light and belongings within reach. Bed in the lowest position. Treaded socks in place. Hourly rounding in progress. Will continue to monitor .

## 2018-12-02 NOTE — PROGRESS NOTES
DC'd home. Al questions answered. DC instructions given by Cherrie SHEPPARD. Cuddles and cord clamp removed. Car seat checked. DC'd home with this RN via wheelchair at 1624 without incident.

## 2018-12-23 ENCOUNTER — OFFICE VISIT (OUTPATIENT)
Dept: URGENT CARE | Facility: CLINIC | Age: 38
End: 2018-12-23
Payer: COMMERCIAL

## 2018-12-23 VITALS
SYSTOLIC BLOOD PRESSURE: 130 MMHG | BODY MASS INDEX: 23.69 KG/M2 | OXYGEN SATURATION: 97 % | WEIGHT: 109.8 LBS | DIASTOLIC BLOOD PRESSURE: 90 MMHG | HEIGHT: 57 IN | RESPIRATION RATE: 16 BRPM | TEMPERATURE: 98.2 F | HEART RATE: 104 BPM

## 2018-12-23 PROCEDURE — 99202 OFFICE O/P NEW SF 15 MIN: CPT | Performed by: PHYSICIAN ASSISTANT

## 2018-12-23 RX ORDER — CEFUROXIME AXETIL 500 MG/1
500 TABLET ORAL 2 TIMES DAILY
Qty: 20 TAB | Refills: 0 | Status: SHIPPED | OUTPATIENT
Start: 2018-12-23 | End: 2019-01-02

## 2018-12-23 RX ORDER — FLUCONAZOLE 150 MG/1
150 TABLET ORAL
Qty: 1 TAB | Refills: 1 | Status: SHIPPED | OUTPATIENT
Start: 2018-12-23

## 2018-12-23 ASSESSMENT — ENCOUNTER SYMPTOMS
MUSCULOSKELETAL NEGATIVE: 1
FEVER: 0
BREAST PAIN: 1
NEUROLOGICAL NEGATIVE: 1
SWOLLEN GLANDS: 0
CONSTITUTIONAL NEGATIVE: 1

## 2018-12-24 NOTE — PROGRESS NOTES
"Subjective:      Criss Walker is a 38 y.o. female who presents with Breast Pain (x2weeks, left breast pain, fever, red streaks on breast)            Breast Pain   This is a new problem. The current episode started in the past 7 days (L breast redn/sore). The problem occurs constantly. The problem has been unchanged. Pertinent negatives include no fever or swollen glands. Nothing aggravates the symptoms. She has tried nothing for the symptoms. The treatment provided no relief.       Review of Systems   Constitutional: Negative.  Negative for fever.   Musculoskeletal: Negative.    Skin: Negative.    Neurological: Negative.           Objective:     /90 (BP Location: Left arm, Patient Position: Sitting, BP Cuff Size: Small adult)   Pulse (!) 104   Temp 36.8 °C (98.2 °F) (Temporal)   Resp 16   Ht 1.448 m (4' 9\")   Wt 49.8 kg (109 lb 12.8 oz)   SpO2 97%   BMI 23.76 kg/m²      Physical Exam   Constitutional: She is oriented to person, place, and time. She appears well-developed and well-nourished. No distress.   Neurological: She is alert and oriented to person, place, and time.   Skin: Skin is warm and dry. Capillary refill takes less than 2 seconds. There is erythema (exam (gloved, dorsal hand palp axill/luoq brst) few mild red streak breast L upper, no indur/abscess).   Psychiatric: She has a normal mood and affect. Her behavior is normal.   Nursing note and vitals reviewed.              Assessment/Plan:     There are no diagnoses linked to this encounter.      "

## 2019-04-19 ENCOUNTER — HOSPITAL ENCOUNTER (OUTPATIENT)
Dept: RADIOLOGY | Facility: MEDICAL CENTER | Age: 39
End: 2019-04-19
Attending: OBSTETRICS & GYNECOLOGY
Payer: COMMERCIAL

## 2019-04-19 DIAGNOSIS — L04.9: ICD-10-CM

## 2019-04-19 PROCEDURE — 76536 US EXAM OF HEAD AND NECK: CPT

## 2021-02-09 ENCOUNTER — HOSPITAL ENCOUNTER (OUTPATIENT)
Dept: LAB | Facility: MEDICAL CENTER | Age: 41
End: 2021-02-09
Attending: FAMILY MEDICINE
Payer: COMMERCIAL

## 2021-02-09 LAB
ALBUMIN SERPL BCP-MCNC: 4.5 G/DL (ref 3.2–4.9)
ALBUMIN/GLOB SERPL: 1.7 G/DL
ALP SERPL-CCNC: 73 U/L (ref 30–99)
ALT SERPL-CCNC: 17 U/L (ref 2–50)
ANION GAP SERPL CALC-SCNC: 10 MMOL/L (ref 7–16)
AST SERPL-CCNC: 17 U/L (ref 12–45)
BASOPHILS # BLD AUTO: 0.5 % (ref 0–1.8)
BASOPHILS # BLD: 0.04 K/UL (ref 0–0.12)
BILIRUB SERPL-MCNC: 0.2 MG/DL (ref 0.1–1.5)
BUN SERPL-MCNC: 18 MG/DL (ref 8–22)
CALCIUM SERPL-MCNC: 9.4 MG/DL (ref 8.5–10.5)
CHLORIDE SERPL-SCNC: 105 MMOL/L (ref 96–112)
CO2 SERPL-SCNC: 25 MMOL/L (ref 20–33)
CREAT SERPL-MCNC: 0.59 MG/DL (ref 0.5–1.4)
CRP SERPL HS-MCNC: 0.16 MG/DL (ref 0–0.75)
EOSINOPHIL # BLD AUTO: 0.06 K/UL (ref 0–0.51)
EOSINOPHIL NFR BLD: 0.7 % (ref 0–6.9)
ERYTHROCYTE [DISTWIDTH] IN BLOOD BY AUTOMATED COUNT: 40.7 FL (ref 35.9–50)
ERYTHROCYTE [SEDIMENTATION RATE] IN BLOOD BY WESTERGREN METHOD: 6 MM/HOUR (ref 0–20)
GLOBULIN SER CALC-MCNC: 2.6 G/DL (ref 1.9–3.5)
GLUCOSE SERPL-MCNC: 82 MG/DL (ref 65–99)
HCT VFR BLD AUTO: 42 % (ref 37–47)
HGB BLD-MCNC: 14 G/DL (ref 12–16)
IMM GRANULOCYTES # BLD AUTO: 0.01 K/UL (ref 0–0.11)
IMM GRANULOCYTES NFR BLD AUTO: 0.1 % (ref 0–0.9)
LYMPHOCYTES # BLD AUTO: 2.76 K/UL (ref 1–4.8)
LYMPHOCYTES NFR BLD: 33.4 % (ref 22–41)
MCH RBC QN AUTO: 29.2 PG (ref 27–33)
MCHC RBC AUTO-ENTMCNC: 33.3 G/DL (ref 33.6–35)
MCV RBC AUTO: 87.5 FL (ref 81.4–97.8)
MONOCYTES # BLD AUTO: 0.55 K/UL (ref 0–0.85)
MONOCYTES NFR BLD AUTO: 6.7 % (ref 0–13.4)
NEUTROPHILS # BLD AUTO: 4.85 K/UL (ref 2–7.15)
NEUTROPHILS NFR BLD: 58.6 % (ref 44–72)
NRBC # BLD AUTO: 0 K/UL
NRBC BLD-RTO: 0 /100 WBC
PLATELET # BLD AUTO: 300 K/UL (ref 164–446)
PMV BLD AUTO: 9.7 FL (ref 9–12.9)
POTASSIUM SERPL-SCNC: 3.8 MMOL/L (ref 3.6–5.5)
PROT SERPL-MCNC: 7.1 G/DL (ref 6–8.2)
RBC # BLD AUTO: 4.8 M/UL (ref 4.2–5.4)
RHEUMATOID FACT SER IA-ACNC: <10 IU/ML (ref 0–14)
SODIUM SERPL-SCNC: 140 MMOL/L (ref 135–145)
TSH SERPL DL<=0.005 MIU/L-ACNC: 1.16 UIU/ML (ref 0.38–5.33)
WBC # BLD AUTO: 8.3 K/UL (ref 4.8–10.8)

## 2021-02-09 PROCEDURE — 85652 RBC SED RATE AUTOMATED: CPT

## 2021-02-09 PROCEDURE — 84443 ASSAY THYROID STIM HORMONE: CPT

## 2021-02-09 PROCEDURE — 86038 ANTINUCLEAR ANTIBODIES: CPT

## 2021-02-09 PROCEDURE — 85025 COMPLETE CBC W/AUTO DIFF WBC: CPT

## 2021-02-09 PROCEDURE — 86255 FLUORESCENT ANTIBODY SCREEN: CPT

## 2021-02-09 PROCEDURE — 80053 COMPREHEN METABOLIC PANEL: CPT

## 2021-02-09 PROCEDURE — 86431 RHEUMATOID FACTOR QUANT: CPT

## 2021-02-09 PROCEDURE — 86747 PARVOVIRUS ANTIBODY: CPT | Mod: 91

## 2021-02-09 PROCEDURE — 36415 COLL VENOUS BLD VENIPUNCTURE: CPT

## 2021-02-09 PROCEDURE — 86140 C-REACTIVE PROTEIN: CPT

## 2021-02-11 LAB
B19V IGG SER IA-ACNC: 0.47 IV
B19V IGM SER IA-ACNC: 0.27 IV
NUCLEAR IGG SER QL IA: NORMAL

## 2021-02-12 LAB — ANCA IGG TITR SER IF: NORMAL {TITER}

## 2021-04-05 ENCOUNTER — HOSPITAL ENCOUNTER (OUTPATIENT)
Dept: RADIOLOGY | Facility: MEDICAL CENTER | Age: 41
End: 2021-04-05
Attending: OBSTETRICS & GYNECOLOGY
Payer: COMMERCIAL

## 2021-04-05 DIAGNOSIS — Z12.31 ENCOUNTER FOR SCREENING MAMMOGRAM FOR MALIGNANT NEOPLASM OF BREAST: ICD-10-CM
